# Patient Record
Sex: MALE | Race: OTHER | Employment: OTHER | URBAN - METROPOLITAN AREA
[De-identification: names, ages, dates, MRNs, and addresses within clinical notes are randomized per-mention and may not be internally consistent; named-entity substitution may affect disease eponyms.]

---

## 2024-10-16 ENCOUNTER — OFFICE VISIT (OUTPATIENT)
Dept: FAMILY MEDICINE CLINIC | Age: 74
End: 2024-10-16
Payer: MEDICARE

## 2024-10-16 VITALS
HEART RATE: 119 BPM | BODY MASS INDEX: 31.58 KG/M2 | OXYGEN SATURATION: 97 % | WEIGHT: 213.2 LBS | TEMPERATURE: 98 F | SYSTOLIC BLOOD PRESSURE: 154 MMHG | DIASTOLIC BLOOD PRESSURE: 110 MMHG | HEIGHT: 69 IN

## 2024-10-16 DIAGNOSIS — Z76.89 ENCOUNTER TO ESTABLISH CARE: ICD-10-CM

## 2024-10-16 DIAGNOSIS — Z11.59 NEED FOR HEPATITIS C SCREENING TEST: ICD-10-CM

## 2024-10-16 DIAGNOSIS — Z21 HIV INFECTION, UNSPECIFIED SYMPTOM STATUS (HCC): ICD-10-CM

## 2024-10-16 DIAGNOSIS — R73.03 PRE-DIABETES: ICD-10-CM

## 2024-10-16 DIAGNOSIS — Z12.5 SCREENING FOR PROSTATE CANCER: ICD-10-CM

## 2024-10-16 DIAGNOSIS — F32.1 CURRENT MODERATE EPISODE OF MAJOR DEPRESSIVE DISORDER WITHOUT PRIOR EPISODE (HCC): ICD-10-CM

## 2024-10-16 DIAGNOSIS — Z13.29 SCREENING FOR THYROID DISORDER: ICD-10-CM

## 2024-10-16 DIAGNOSIS — I10 PRIMARY HYPERTENSION: Primary | ICD-10-CM

## 2024-10-16 DIAGNOSIS — F17.200 SMOKER: ICD-10-CM

## 2024-10-16 DIAGNOSIS — Z13.0 SCREENING FOR DEFICIENCY ANEMIA: ICD-10-CM

## 2024-10-16 DIAGNOSIS — Z13.220 SCREENING FOR LIPID DISORDERS: ICD-10-CM

## 2024-10-16 DIAGNOSIS — Z12.11 SCREENING FOR COLON CANCER: ICD-10-CM

## 2024-10-16 DIAGNOSIS — F32.1 MODERATE MAJOR DEPRESSION, SINGLE EPISODE (HCC): ICD-10-CM

## 2024-10-16 PROBLEM — Z91.89 AT RISK FOR MEDICATION NONCOMPLIANCE: Status: ACTIVE | Noted: 2024-10-16

## 2024-10-16 PROCEDURE — 99204 OFFICE O/P NEW MOD 45 MIN: CPT | Performed by: INTERNAL MEDICINE

## 2024-10-16 PROCEDURE — 90677 PCV20 VACCINE IM: CPT | Performed by: INTERNAL MEDICINE

## 2024-10-16 PROCEDURE — 1123F ACP DISCUSS/DSCN MKR DOCD: CPT | Performed by: INTERNAL MEDICINE

## 2024-10-16 PROCEDURE — G0009 ADMIN PNEUMOCOCCAL VACCINE: HCPCS | Performed by: INTERNAL MEDICINE

## 2024-10-16 PROCEDURE — G0008 ADMIN INFLUENZA VIRUS VAC: HCPCS | Performed by: INTERNAL MEDICINE

## 2024-10-16 PROCEDURE — 3080F DIAST BP >= 90 MM HG: CPT | Performed by: INTERNAL MEDICINE

## 2024-10-16 PROCEDURE — 3077F SYST BP >= 140 MM HG: CPT | Performed by: INTERNAL MEDICINE

## 2024-10-16 PROCEDURE — 90653 IIV ADJUVANT VACCINE IM: CPT | Performed by: INTERNAL MEDICINE

## 2024-10-16 RX ORDER — SERTRALINE HYDROCHLORIDE 25 MG/1
25 TABLET, FILM COATED ORAL DAILY
Qty: 30 TABLET | Refills: 0 | Status: SHIPPED | OUTPATIENT
Start: 2024-10-16 | End: 2024-10-16

## 2024-10-16 RX ORDER — METOPROLOL SUCCINATE 50 MG/1
50 TABLET, EXTENDED RELEASE ORAL DAILY
Qty: 90 TABLET | Refills: 1 | Status: SHIPPED | OUTPATIENT
Start: 2024-10-16

## 2024-10-16 RX ORDER — SERTRALINE HYDROCHLORIDE 25 MG/1
25 TABLET, FILM COATED ORAL DAILY
Qty: 30 TABLET | Refills: 0 | Status: SHIPPED | OUTPATIENT
Start: 2024-10-16 | End: 2024-11-15

## 2024-10-16 SDOH — ECONOMIC STABILITY: FOOD INSECURITY: WITHIN THE PAST 12 MONTHS, THE FOOD YOU BOUGHT JUST DIDN'T LAST AND YOU DIDN'T HAVE MONEY TO GET MORE.: NEVER TRUE

## 2024-10-16 SDOH — ECONOMIC STABILITY: FOOD INSECURITY: WITHIN THE PAST 12 MONTHS, YOU WORRIED THAT YOUR FOOD WOULD RUN OUT BEFORE YOU GOT MONEY TO BUY MORE.: NEVER TRUE

## 2024-10-16 SDOH — ECONOMIC STABILITY: INCOME INSECURITY: HOW HARD IS IT FOR YOU TO PAY FOR THE VERY BASICS LIKE FOOD, HOUSING, MEDICAL CARE, AND HEATING?: NOT HARD AT ALL

## 2024-10-16 ASSESSMENT — PATIENT HEALTH QUESTIONNAIRE - PHQ9
9. THOUGHTS THAT YOU WOULD BE BETTER OFF DEAD, OR OF HURTING YOURSELF: SEVERAL DAYS
3. TROUBLE FALLING OR STAYING ASLEEP: NEARLY EVERY DAY
5. POOR APPETITE OR OVEREATING: NEARLY EVERY DAY
SUM OF ALL RESPONSES TO PHQ QUESTIONS 1-9: 12
7. TROUBLE CONCENTRATING ON THINGS, SUCH AS READING THE NEWSPAPER OR WATCHING TELEVISION: NOT AT ALL
SUM OF ALL RESPONSES TO PHQ9 QUESTIONS 1 & 2: 2
4. FEELING TIRED OR HAVING LITTLE ENERGY: NOT AT ALL
SUM OF ALL RESPONSES TO PHQ QUESTIONS 1-9: 12
8. MOVING OR SPEAKING SO SLOWLY THAT OTHER PEOPLE COULD HAVE NOTICED. OR THE OPPOSITE, BEING SO FIGETY OR RESTLESS THAT YOU HAVE BEEN MOVING AROUND A LOT MORE THAN USUAL: NOT AT ALL
SUM OF ALL RESPONSES TO PHQ QUESTIONS 1-9: 12
2. FEELING DOWN, DEPRESSED OR HOPELESS: SEVERAL DAYS
SUM OF ALL RESPONSES TO PHQ QUESTIONS 1-9: 11
6. FEELING BAD ABOUT YOURSELF - OR THAT YOU ARE A FAILURE OR HAVE LET YOURSELF OR YOUR FAMILY DOWN: NEARLY EVERY DAY
1. LITTLE INTEREST OR PLEASURE IN DOING THINGS: SEVERAL DAYS
10. IF YOU CHECKED OFF ANY PROBLEMS, HOW DIFFICULT HAVE THESE PROBLEMS MADE IT FOR YOU TO DO YOUR WORK, TAKE CARE OF THINGS AT HOME, OR GET ALONG WITH OTHER PEOPLE: SOMEWHAT DIFFICULT

## 2024-10-16 ASSESSMENT — ENCOUNTER SYMPTOMS
NAUSEA: 0
ABDOMINAL PAIN: 0
VOICE CHANGE: 0
BLOOD IN STOOL: 0
CONSTIPATION: 0
PHOTOPHOBIA: 0
EYE PAIN: 0
COLOR CHANGE: 0

## 2024-10-16 ASSESSMENT — COLUMBIA-SUICIDE SEVERITY RATING SCALE - C-SSRS
7. DID THIS OCCUR IN THE LAST THREE MONTHS: NO
1. WITHIN THE PAST MONTH, HAVE YOU WISHED YOU WERE DEAD OR WISHED YOU COULD GO TO SLEEP AND NOT WAKE UP?: YES
6. HAVE YOU EVER DONE ANYTHING, STARTED TO DO ANYTHING, OR PREPARED TO DO ANYTHING TO END YOUR LIFE?: YES
2. HAVE YOU ACTUALLY HAD ANY THOUGHTS OF KILLING YOURSELF?: NO

## 2024-10-16 NOTE — ASSESSMENT & PLAN NOTE
He has HIV since 1996 was taking medications up until he moved to [place] which is 5 years ago prefer to get referral to  infectious disease

## 2024-10-16 NOTE — PROGRESS NOTES
PHQ-9 score today: (PHQ-9 Total Score: 12), additional evaluation and assessment performed, follow-up plan includes but not limited to: Medication management and Referral to /Specialist  for evaluation and management.     [facility]  [address]  [facility] [address]  Dept: [phone]  Dept Fax: [phone]  Loc: [phone]     10/16/2024    Visit type: Establish care    Reason for Visit: Establish Care (Patient presents today to establish care. Patient has not seen a PCP in 5 years. Last PCP located in [place]. Patient has medical history of pre-dm. Patient reports being on medication for htn and thyroid management and depression but cannot recall the names. )       ASSESSMENT/PLAN   1. Primary hypertension  Assessment & Plan:   Elevated blood pressure today in the office. Will keep close F/U with nurse visit BP check in 1 week and HTN F/U in 1 month.  Due to not taking medications he did not take medications for 5 years  2. Encounter to establish care  3. Smoker  Assessment & Plan:   Advised to quit smoking  4. HIV infection, unspecified symptom status (HCC)  Assessment & Plan:   He has HIV since 1996 was taking medications up until he moved to [place] which is 5 years ago prefer to get referral to infectious disease  Orders:  -     Microalbumin / Creatinine Urine Ratio; Future  -     Ambulatory referral to Infectious Disease  5. Pre-diabetes  Assessment & Plan:   Recent labwork shows the 3 month average blood sugar as reported by the A1c level is in the pre-diabetic range. This result increases the future risk of developing diabetes. A healthy lower carbohydrate diet that is high in fresh vegetables, fiber and whole grains is important. Weight loss is recommended along with routine exercise working toward a goal of 150 minutes a week. We will follow this over time with repeat testing every 6-12 months.   Orders:  -     Comprehensive Metabolic

## 2024-10-16 NOTE — ASSESSMENT & PLAN NOTE
Elevated blood pressure today in the office. Will keep close F/U with nurse visit BP check in 1 week and HTN F/U in 1 month.  Due to not taking medications he did not take medications for 5 years

## 2024-10-16 NOTE — ASSESSMENT & PLAN NOTE
Did not take any medications for 5 years did not see any doctors for 5 years since he moved to [place] from [place]

## 2024-10-16 NOTE — ASSESSMENT & PLAN NOTE
Started on Zoloft but patient is not in suicidal ideation  Strongly advised if she or develop any suicidal ideation he has to alert his children or call the ambulance and go to the emergency room or give us a call

## 2024-10-18 ENCOUNTER — TELEPHONE (OUTPATIENT)
Dept: INFECTIOUS DISEASES | Age: 74
End: 2024-10-18

## 2024-10-18 NOTE — TELEPHONE ENCOUNTER
Pt returned call.   From [place]  No meds x 5 years  Can not remember lowest CD4  Discussed intake process.   Discussed [facility] and provided services.   Pt verbalized understanding   He will provide SS income, ID/bill and insurance card.   Intake scheduled for 11.4.24 per pt request,   This RN did offer earlier apt. Pt prefers 11.4.24

## 2024-10-30 ENCOUNTER — HOSPITAL ENCOUNTER (OUTPATIENT)
Dept: LAB | Age: 74
Discharge: HOME | End: 2024-10-30
Payer: MEDICARE

## 2024-10-30 ENCOUNTER — OFFICE VISIT (OUTPATIENT)
Dept: FAMILY MEDICINE CLINIC | Age: 74
End: 2024-10-30

## 2024-10-30 VITALS
BODY MASS INDEX: 31.48 KG/M2 | OXYGEN SATURATION: 99 % | DIASTOLIC BLOOD PRESSURE: 102 MMHG | HEIGHT: 69 IN | TEMPERATURE: 97.5 F | SYSTOLIC BLOOD PRESSURE: 166 MMHG | HEART RATE: 97 BPM | RESPIRATION RATE: 20 BRPM

## 2024-10-30 DIAGNOSIS — Z13.0 SCREENING FOR DEFICIENCY ANEMIA: ICD-10-CM

## 2024-10-30 DIAGNOSIS — Z11.59 NEED FOR HEPATITIS C SCREENING TEST: ICD-10-CM

## 2024-10-30 DIAGNOSIS — Z21 HIV INFECTION, UNSPECIFIED SYMPTOM STATUS (HCC): ICD-10-CM

## 2024-10-30 DIAGNOSIS — Z12.5 SCREENING FOR PROSTATE CANCER: ICD-10-CM

## 2024-10-30 DIAGNOSIS — Z13.220 SCREENING FOR LIPID DISORDERS: ICD-10-CM

## 2024-10-30 DIAGNOSIS — B20 CURRENTLY ASYMPTOMATIC HIV INFECTION, WITH HISTORY OF HIV-RELATED ILLNESS (HCC): ICD-10-CM

## 2024-10-30 DIAGNOSIS — F32.1 CURRENT MODERATE EPISODE OF MAJOR DEPRESSIVE DISORDER WITHOUT PRIOR EPISODE (HCC): ICD-10-CM

## 2024-10-30 DIAGNOSIS — R73.03 PRE-DIABETES: ICD-10-CM

## 2024-10-30 DIAGNOSIS — Z13.29 SCREENING FOR THYROID DISORDER: ICD-10-CM

## 2024-10-30 DIAGNOSIS — I10 PRIMARY HYPERTENSION: Primary | ICD-10-CM

## 2024-10-30 PROBLEM — Z76.89 ENCOUNTER TO ESTABLISH CARE: Status: RESOLVED | Noted: 2024-10-16 | Resolved: 2024-10-30

## 2024-10-30 LAB
ALBUMIN SERPL-MCNC: 4.2 G/DL (ref 3.5–4.6)
ALP SERPL-CCNC: 113 U/L (ref 35–104)
ALT SERPL-CCNC: 9 U/L (ref 0–41)
ANION GAP SERPL CALCULATED.3IONS-SCNC: 16 MEQ/L (ref 9–15)
AST SERPL-CCNC: 15 U/L (ref 0–40)
BASOPHILS # BLD: 0.1 K/UL (ref 0–0.2)
BASOPHILS NFR BLD: 1.1 %
BILIRUB SERPL-MCNC: 0.5 MG/DL (ref 0.2–0.7)
BUN SERPL-MCNC: 18 MG/DL (ref 8–23)
CALCIUM SERPL-MCNC: 9.3 MG/DL (ref 8.5–9.9)
CHLORIDE SERPL-SCNC: 99 MEQ/L (ref 95–107)
CHOLEST SERPL-MCNC: 221 MG/DL (ref 0–199)
CO2 SERPL-SCNC: 20 MEQ/L (ref 20–31)
CREAT SERPL-MCNC: 1.26 MG/DL (ref 0.7–1.2)
CREAT UR-MCNC: 189.1 MG/DL
EOSINOPHIL # BLD: 0.1 K/UL (ref 0–0.7)
EOSINOPHIL NFR BLD: 1.6 %
ERYTHROCYTE [DISTWIDTH] IN BLOOD BY AUTOMATED COUNT: 13 % (ref 11.5–14.5)
GLOBULIN SER CALC-MCNC: 4.9 G/DL (ref 2.3–3.5)
GLUCOSE FASTING: 157 MG/DL (ref 70–99)
HCT VFR BLD AUTO: 47.1 % (ref 42–52)
HDLC SERPL-MCNC: 37 MG/DL (ref 40–59)
HGB BLD-MCNC: 16.3 G/DL (ref 14–18)
LDL CHOLESTEROL: 139 MG/DL (ref 0–129)
LYMPHOCYTES # BLD: 2 K/UL (ref 1–4.8)
LYMPHOCYTES NFR BLD: 26.5 %
MCH RBC QN AUTO: 29.4 PG (ref 27–31.3)
MCHC RBC AUTO-ENTMCNC: 34.6 % (ref 33–37)
MCV RBC AUTO: 84.9 FL (ref 79–92.2)
MICROALBUMIN UR-MCNC: 332.1 MG/DL
MICROALBUMIN/CREAT UR-RTO: 1756.2 MG/G (ref 0–30)
MONOCYTES # BLD: 0.7 K/UL (ref 0.2–0.8)
MONOCYTES NFR BLD: 8.7 %
NEUTROPHILS # BLD: 4.6 K/UL (ref 1.4–6.5)
NEUTS SEG NFR BLD: 60.6 %
PLATELET # BLD AUTO: 420 K/UL (ref 130–400)
POTASSIUM SERPL-SCNC: 3.9 MEQ/L (ref 3.4–4.9)
PROT SERPL-MCNC: 9.1 G/DL (ref 6.3–8)
PSA SERPL-MCNC: 1.96 NG/ML (ref 0–4)
RBC # BLD AUTO: 5.55 M/UL (ref 4.7–6.1)
SODIUM SERPL-SCNC: 135 MEQ/L (ref 135–144)
TRIGLYCERIDE, FASTING: 226 MG/DL (ref 0–150)
TSH SERPL-MCNC: 6.45 UIU/ML (ref 0.44–3.86)
WBC # BLD AUTO: 7.6 K/UL (ref 4.8–10.8)

## 2024-10-30 PROCEDURE — 84443 ASSAY THYROID STIM HORMONE: CPT

## 2024-10-30 PROCEDURE — 80053 COMPREHEN METABOLIC PANEL: CPT

## 2024-10-30 PROCEDURE — 36415 COLL VENOUS BLD VENIPUNCTURE: CPT

## 2024-10-30 PROCEDURE — 82043 UR ALBUMIN QUANTITATIVE: CPT

## 2024-10-30 PROCEDURE — 85025 COMPLETE CBC W/AUTO DIFF WBC: CPT

## 2024-10-30 PROCEDURE — 86803 HEPATITIS C AB TEST: CPT

## 2024-10-30 PROCEDURE — 84153 ASSAY OF PSA TOTAL: CPT

## 2024-10-30 PROCEDURE — 83036 HEMOGLOBIN GLYCOSYLATED A1C: CPT

## 2024-10-30 PROCEDURE — 82570 ASSAY OF URINE CREATININE: CPT

## 2024-10-30 PROCEDURE — 80061 LIPID PANEL: CPT

## 2024-10-30 RX ORDER — METOPROLOL SUCCINATE 25 MG/1
25 TABLET, EXTENDED RELEASE ORAL NIGHTLY
Qty: 90 TABLET | Refills: 1 | Status: SHIPPED | OUTPATIENT
Start: 2024-10-30

## 2024-10-30 ASSESSMENT — ENCOUNTER SYMPTOMS
VOICE CHANGE: 0
ABDOMINAL PAIN: 0
EYE PAIN: 0
BLOOD IN STOOL: 0
PHOTOPHOBIA: 0
COLOR CHANGE: 0
CONSTIPATION: 0
NAUSEA: 0

## 2024-10-30 NOTE — ASSESSMENT & PLAN NOTE
Still uncontrolled strongly advised DASH diet continue metoprolol 50 mg in the morning I added metoprolol 20 5 at night follow-up in 1 week

## 2024-10-30 NOTE — PROGRESS NOTES
[facility]  [address]  [facility] [address]  Dept: [phone]  Dept Fax: [phone]  Loc: [phone]     10/30/2024    Visit type: Follow up on hypertension    Reason for Visit: Follow-up (Patient is here for a 1 wk follow up)       ASSESSMENT/PLAN   1. Primary hypertension  Assessment & Plan:   Still uncontrolled strongly advised DASH diet continue metoprolol 50 mg in the morning I added metoprolol 20 5 at night follow-up in 1 week  2. Pre-diabetes  3. Currently asymptomatic HIV infection, with history of HIV-related illness (HCC)  Assessment & Plan:   Has an appointment with infectious disease on November 4  4. Current moderate episode of major depressive disorder without prior episode ([facility])  Assessment & Plan:   Started on Zoloft but patient is not in suicidal ideation  Strongly advised if she or develop any suicidal ideation he has to alert his children or call the ambulance and go to the emergency room or give us a call    Return in about 1 week (around 11/6/2024).  Orders Placed This Encounter   Medications    metoprolol succinate (TOPROL XL) 25 MG extended release tablet     Sig: Take 1 tablet by mouth at bedtime     Dispense:  90 tablet     Refill:  1        Subjective        Subjective    HPI:  Patient: Juan Luis Billings is a 74 y.o. male past medical history of uncontrolled hypertension depression HIV with an appointment infectious disease for November 4 he supposed to do blood work but he did not do it he is here for follow-up on his blood pressure during the last visit he did not take his medication for couple of days but he is taking now every day still his blood pressure is elevated no headache no chest pain no shortness of breath      Review of Systems   Constitutional:  Negative for appetite change and fatigue.   HENT:  Negative for dental problem, hearing loss, nosebleeds and voice change.    Eyes:  Negative for photophobia, pain

## 2024-10-31 LAB
ESTIMATED AVERAGE GLUCOSE: 148 MG/DL
HBA1C MFR BLD: 6.8 % (ref 4–6)
HEPATITIS C ANTIBODY: REACTIVE

## 2024-11-04 ENCOUNTER — LAB (OUTPATIENT)
Dept: INFECTIOUS DISEASES | Age: 74
End: 2024-11-04

## 2024-11-04 DIAGNOSIS — Z21 HIV INFECTION, UNSPECIFIED SYMPTOM STATUS (HCC): Primary | ICD-10-CM

## 2024-11-04 DIAGNOSIS — Z21 HIV INFECTION, UNSPECIFIED SYMPTOM STATUS (HCC): ICD-10-CM

## 2024-11-04 LAB
ALBUMIN SERPL-MCNC: 3.9 G/DL (ref 3.5–4.6)
ALP SERPL-CCNC: 115 U/L (ref 35–104)
ALT SERPL-CCNC: 12 U/L (ref 0–41)
ANION GAP SERPL CALCULATED.3IONS-SCNC: 15 MEQ/L (ref 9–15)
AST SERPL-CCNC: 16 U/L (ref 0–40)
BILIRUB SERPL-MCNC: 0.4 MG/DL (ref 0.2–0.7)
BUN SERPL-MCNC: 18 MG/DL (ref 8–23)
CALCIUM SERPL-MCNC: 9.3 MG/DL (ref 8.5–9.9)
CHLORIDE SERPL-SCNC: 102 MEQ/L (ref 95–107)
CO2 SERPL-SCNC: 20 MEQ/L (ref 20–31)
CREAT SERPL-MCNC: 1.36 MG/DL (ref 0.7–1.2)
ERYTHROCYTE [DISTWIDTH] IN BLOOD BY AUTOMATED COUNT: 13.1 % (ref 11.5–14.5)
GLOBULIN SER CALC-MCNC: 4.6 G/DL (ref 2.3–3.5)
GLUCOSE SERPL-MCNC: 168 MG/DL (ref 70–99)
HCT VFR BLD AUTO: 43.3 % (ref 42–52)
HGB BLD-MCNC: 15.2 G/DL (ref 14–18)
MCH RBC QN AUTO: 29.4 PG (ref 27–31.3)
MCHC RBC AUTO-ENTMCNC: 35.1 % (ref 33–37)
MCV RBC AUTO: 83.8 FL (ref 79–92.2)
PLATELET # BLD AUTO: 382 K/UL (ref 130–400)
POTASSIUM SERPL-SCNC: 3.9 MEQ/L (ref 3.4–4.9)
PROT SERPL-MCNC: 8.5 G/DL (ref 6.3–8)
RBC # BLD AUTO: 5.17 M/UL (ref 4.7–6.1)
SODIUM SERPL-SCNC: 137 MEQ/L (ref 135–144)
WBC # BLD AUTO: 6.2 K/UL (ref 4.8–10.8)

## 2024-11-04 ASSESSMENT — PATIENT HEALTH QUESTIONNAIRE - PHQ9
SUM OF ALL RESPONSES TO PHQ QUESTIONS 1-9: 1
1. LITTLE INTEREST OR PLEASURE IN DOING THINGS: NOT AT ALL
2. FEELING DOWN, DEPRESSED OR HOPELESS: SEVERAL DAYS
SUM OF ALL RESPONSES TO PHQ QUESTIONS 1-9: 1
SUM OF ALL RESPONSES TO PHQ9 QUESTIONS 1 & 2: 1

## 2024-11-04 NOTE — PROGRESS NOTES
RN Intake      Patient ID:   Juan Luis Billings  Date:   2024        :[birth year]       Residency:   [address] [place] [id number]  [phone] (home)   [x] OK to leave voicemail     Race:    Family/House:    [] Partner  [] Children  [x] Other - alone    Housing:   apartment    Transport:  Car -- uses friends car or daughters   Discussed help with transport only as last resort.        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 Pt declined.  resources given to pt.    Discussed referral if needed.  Substance Abuse:   Social History     Socioeconomic History    Marital status: Unknown     Spouse name: Not on file    Number of children: Not on file    Years of education: Not on file    Highest education level: Not on file   Occupational History    Not on file   Tobacco Use    Smoking status: Every Day     Current packs/day: 0.50     Average packs/day: 0.5 packs/day for 45.8 years (22.9 ttl pk-yrs)     Types: Cigarettes     Start date:     Smokeless tobacco: Never   Vaping Use    Vaping status: Never Used   Substance and Sexual Activity    Alcohol use: Yes     Alcohol/week: 2.0 standard drinks of alcohol     Types: 2 Cans of beer per week     Comment: per week    Drug use: Not Currently    Sexual activity: Yes     Partners: Female   Other Topics Concern    Not on file   Social History Narrative    Not on file     Social Determinants of Health     Financial Resource Strain: Low Risk  (10/16/2024)    Overall Financial Resource Strain (CARDIA)     Difficulty of Paying Living Expenses: Not hard at all   Food Insecurity: No Food Insecurity (10/16/2024)    Hunger Vital Sign     Worried About Running Out of Food in the Last Year: Never true     Ran Out of Food in the Last Year: Never true   Transportation Needs: Unknown (10/16/2024)    PRAPARE - Transportation     Lack of Transportation (Medical): Not on file     Lack of

## 2024-11-05 LAB
CD3+CD4+ CELLS # BLD: 341 CELLS/UL
CD4 % HELPER T CELL: 20 % (ref 35–68)
IMMUNODEFICIENCY MARKERS SPEC-IMP: ABNORMAL
RPR SER QL: NORMAL

## 2024-11-06 LAB
GAMMA INTERFERON BACKGROUND BLD IA-ACNC: 0.23 IU/ML
HCV RNA SERPL NAA+PROBE-ACNC: NOT DETECTED IU/ML
HCV RNA SERPL NAA+PROBE-LOG IU: NOT DETECTED LOG IU/ML
HCV RNA SERPL QL NAA+PROBE: NOT DETECTED
M TB IFN-G BLD-IMP: NEGATIVE
M TB IFN-G CD4+ BCKGRND COR BLD-ACNC: 0.28 IU/ML
M TB IFN-G CD4+CD8+ BCKGRND COR BLD-ACNC: 0.17 IU/ML
MITOGEN IGNF BCKGRD COR BLD-ACNC: 9.49 IU/ML

## 2024-11-07 LAB
HIV QUANT LOG: 3.27 LOG CPY/ML
HIV-1 RNA BY PCR, QN: 1850 CPY/ML
HIV-1 RNA BY PCR, QN: DETECTED
SOURCE: ABNORMAL

## 2024-11-07 NOTE — PROGRESS NOTES
Records received 11.7.24  Pt to sign EFRAIN for Care Everywhere at apt.   Paper EFRAIN competed for record retrieval prior to initial apt here.       LOV 6.28.2019    Labs to note:   9.21.18 VL - not detected   9.21.18 CD4-- 449 26 %    2.22.19: VL -- < 40   2.22.19 CD4-- 370 21%      8.19.2010 --HEPAVAB - positive  8.19.2010 -- HEPBSAG - negative  8.28.14 -- HEPBSAB -- negative     Vaccines:  Hep B   12.17.13  6.16.14   1.15.14    PPSV23 - 2013  TDAP - 2015    History if IVDU and ETOH abuse  Per notes stopped 1990 (heroin and crack)   Stopped drinking 1996

## 2024-11-09 LAB
HIV 1 RNA RT + PR + IN MUT DET PLAS SEQ: NORMAL
LABORATORY REPORT: NORMAL

## 2024-11-11 DIAGNOSIS — F32.1 MODERATE MAJOR DEPRESSION, SINGLE EPISODE (HCC): ICD-10-CM

## 2024-11-11 RX ORDER — SERTRALINE HYDROCHLORIDE 25 MG/1
25 TABLET, FILM COATED ORAL DAILY
Qty: 30 TABLET | Refills: 0 | Status: SHIPPED | OUTPATIENT
Start: 2024-11-11 | End: 2024-12-11

## 2024-11-11 NOTE — TELEPHONE ENCOUNTER
Comments:     Last Office Visit (last PCP visit):   10/30/2024    Next Visit Date:  Future Appointments   Date Time Provider Department Center   11/11/2024  9:15 AM Mari Bello MD [facility] DEP   11/11/2024  3:00 PM Mari Bello MD [facility] DEP   11/20/2024  9:00 AM Kathryn León MD [facility]   11/20/2024 10:00 AM Gina Rodriguez LISW [facility]       **If hasn't been seen in over a year OR hasn't followed up according to last diabetes/ADHD visit, make appointment for patient before sending refill to provider.    Rx requested:  Requested Prescriptions     Pending Prescriptions Disp Refills    sertraline (ZOLOFT) 25 MG tablet [Pharmacy Med Name: SERTRALINE 25MG TABLETS] 30 tablet 0     Sig: TAKE 1 TABLET BY MOUTH DAILY

## 2024-11-15 PROBLEM — Z13.0 SCREENING FOR DEFICIENCY ANEMIA: Status: RESOLVED | Noted: 2024-10-16 | Resolved: 2024-11-15

## 2024-11-15 PROBLEM — Z11.59 NEED FOR HEPATITIS C SCREENING TEST: Status: RESOLVED | Noted: 2024-10-16 | Resolved: 2024-11-15

## 2024-11-15 PROBLEM — Z12.11 SCREENING FOR COLON CANCER: Status: RESOLVED | Noted: 2024-10-16 | Resolved: 2024-11-15

## 2024-11-15 PROBLEM — Z12.5 SCREENING FOR PROSTATE CANCER: Status: RESOLVED | Noted: 2024-10-16 | Resolved: 2024-11-15

## 2024-11-15 PROBLEM — Z13.220 SCREENING FOR LIPID DISORDERS: Status: RESOLVED | Noted: 2024-10-16 | Resolved: 2024-11-15

## 2024-11-15 PROBLEM — Z13.29 SCREENING FOR THYROID DISORDER: Status: RESOLVED | Noted: 2024-10-16 | Resolved: 2024-11-15

## 2024-11-20 ENCOUNTER — OFFICE VISIT (OUTPATIENT)
Dept: INFECTIOUS DISEASES | Age: 74
End: 2024-11-20
Payer: MEDICARE

## 2024-11-20 ENCOUNTER — LAB (OUTPATIENT)
Dept: INFECTIOUS DISEASES | Age: 74
End: 2024-11-20

## 2024-11-20 VITALS
WEIGHT: 214.2 LBS | HEART RATE: 96 BPM | SYSTOLIC BLOOD PRESSURE: 157 MMHG | TEMPERATURE: 97.2 F | BODY MASS INDEX: 31.63 KG/M2 | DIASTOLIC BLOOD PRESSURE: 94 MMHG

## 2024-11-20 DIAGNOSIS — E78.2 MIXED HYPERLIPIDEMIA: ICD-10-CM

## 2024-11-20 DIAGNOSIS — N18.31 STAGE 3A CHRONIC KIDNEY DISEASE (HCC): ICD-10-CM

## 2024-11-20 DIAGNOSIS — I10 HYPERTENSION, UNSPECIFIED TYPE: ICD-10-CM

## 2024-11-20 DIAGNOSIS — B20 HUMAN IMMUNODEFICIENCY VIRUS (HIV) DISEASE (HCC): Primary | ICD-10-CM

## 2024-11-20 DIAGNOSIS — R73.03 BORDERLINE TYPE 2 DIABETES MELLITUS: ICD-10-CM

## 2024-11-20 DIAGNOSIS — Z87.891 PERSONAL HISTORY OF TOBACCO USE: ICD-10-CM

## 2024-11-20 PROCEDURE — 1159F MED LIST DOCD IN RCRD: CPT | Performed by: INTERNAL MEDICINE

## 2024-11-20 PROCEDURE — 3077F SYST BP >= 140 MM HG: CPT | Performed by: INTERNAL MEDICINE

## 2024-11-20 PROCEDURE — G0296 VISIT TO DETERM LDCT ELIG: HCPCS | Performed by: INTERNAL MEDICINE

## 2024-11-20 PROCEDURE — 99204 OFFICE O/P NEW MOD 45 MIN: CPT | Performed by: INTERNAL MEDICINE

## 2024-11-20 PROCEDURE — 1123F ACP DISCUSS/DSCN MKR DOCD: CPT | Performed by: INTERNAL MEDICINE

## 2024-11-20 PROCEDURE — 3080F DIAST BP >= 90 MM HG: CPT | Performed by: INTERNAL MEDICINE

## 2024-11-20 RX ORDER — LEVOTHYROXINE SODIUM 75 UG/1
75 TABLET ORAL DAILY
Qty: 30 TABLET | Refills: 5 | Status: SHIPPED | OUTPATIENT
Start: 2024-11-20

## 2024-11-20 RX ORDER — LISINOPRIL 10 MG/1
10 TABLET ORAL DAILY
Qty: 30 TABLET | Refills: 5 | Status: SHIPPED | OUTPATIENT
Start: 2024-11-20

## 2024-11-20 RX ORDER — ROSUVASTATIN CALCIUM 20 MG/1
20 TABLET, COATED ORAL NIGHTLY
Qty: 30 TABLET | Refills: 5 | Status: SHIPPED | OUTPATIENT
Start: 2024-11-20

## 2024-11-20 ASSESSMENT — PATIENT HEALTH QUESTIONNAIRE - PHQ9
SUM OF ALL RESPONSES TO PHQ9 QUESTIONS 1 & 2: 0
SUM OF ALL RESPONSES TO PHQ QUESTIONS 1-9: 0
SUM OF ALL RESPONSES TO PHQ QUESTIONS 1-9: 0
1. LITTLE INTEREST OR PLEASURE IN DOING THINGS: NOT AT ALL
SUM OF ALL RESPONSES TO PHQ QUESTIONS 1-9: 0
SUM OF ALL RESPONSES TO PHQ QUESTIONS 1-9: 0
2. FEELING DOWN, DEPRESSED OR HOPELESS: NOT AT ALL

## 2024-11-20 NOTE — PROGRESS NOTES
New pt apt today.   Doing ok. Ready to start medications.    Discussed readiness to start medication and reviewed need for 100% compliance.     Patient will be starting new medication Biktarvy - 1 tab daily   Reviewed pill size    Educated patient @ length about medication along with most common side effect a  Discussed how and when to take medication  (one pill a day. Usually at the same time.. AM or PM  NO DOUBLE DOSEING - can hurt kidneys/liver  Take with food (or with out) recommended with food at start,     Side effects tend to wear off after 4 weeks or so and not everyone has side effects   pt verbalized understanding of medication and how to take it.   Pill box given to pt to help with compliance        GOAL -- medication is ok to be filled at requested pharmacy and go through today without issue.   Discussed with pt possibility of pharmacy needing to order medication or possibility of need for PA. Pt verbalized understanding.    Pt will call pharmacy later this afternoon to see if med was processed. Will call with problems regarding med.   Pt is to call with start date.     Patient advised he will need to obtain labs 4-6 weeks after start date. -- approx week of 12.20.24  Requested patient to call office with any intolerable side effects or further questions.    6-8 week follow up with Dr eLón scheduled -- 1.20.24    Pt also was prescribed -- per Dr León  Crestor 20 mg daily --   Synthroid  75 MCG daily  Lisinopril 10 mg daily  Pt is to STOP metoprolol 25 mg and continue 50 mg only  All medications reviewed at length.education regarding each medication and why it is needed discussed. We wrote all medications out as well. He read back and verbalized understanding.   He will call [facility] for these meds and  today.     He is aware that Biktarvy may need ordered or PA needed possibility. If he can start ART today he will call office with update.           Patient verbalized all understanding and 
tenderness, no hepatosplenomegaly  No leg edema  No erythema, no tenderness  Normal speech and gait  Normal affect and judgment  Normal behavior    Current labs were reviewed  CD4 of 341  Complete metabolic profile with creatinine of 1.36 and GFR of 54.5  Elevated total protein and globulin and alkaline phosphatase noted and  HIV viral load of 1850  Hep C viral load is undetectable  HIV phenotypic analysis was reviewed with high level of assistance to if evidence and nifedipine  TB QuantiFERON and RPR is negative  Non-nucleoside Reverse Transcriptase Inhibitor Drug Class             Doravirine,LEATHA              Susceptible             Efavirenz,EFV               High-Level Resistance             Etravirine,ETR              Susceptible             Nevirapine,NVP              High-Level Resistance             Rilpivirine,RPV             Susceptible               NNRTI drug resistance mutations identified:  [id number]               RT accessory resistance mutations identified:  None               RT additional mutations identified: R83K, K101R, D123E,   V245E,   A272P, V276I, K277R, K281R, E297K, K390R, A400T, F416Y, V417I, V435I   Sensitive vitals otherwise  Last hemoglobin A1c of 6.8  Lipid profile with elevated cholesterol of 221 LDL of 139  Elevated triglyceride and low HDL  An electronic signature was used to authenticate this note.    --Kathryn León MD   Discussed with the patient the current USPSTF guidelines released March 9, 2021 for screening for lung cancer.    For adults aged 50 to 80 years who have a 20 pack-year smoking history and currently smoke or have quit within the past 15 years the grade B recommendation is to:  Screen for lung cancer with low-dose computed tomography (LDCT) every year.  Stop screening once a person has not smoked for 15 years or has a health problem that limits life expectancy or the ability to have lung surgery.    The patient  reports that he has been smoking cigarettes. He started

## 2024-11-20 NOTE — PATIENT INSTRUCTIONS
packs a day for 10 years, that's 2 times 10. So you have a smoking history of 20 pack years.  Experts agree that screening is for people who have a high risk of lung cancer. But experts don't agree on what high risk means. Some say people age 50 or older with at least a 20-pack-year smoking history are high risk. Others say it's people age 55 or older with a 30-pack-year history.  To see if you could benefit from screening, first find out if you are at high risk for lung cancer. Your doctor can help you decide your lung cancer risk.  What are the risks of screening?  CT screening for lung cancer isn't perfect. It can show an abnormal result when it turns out there wasn't any cancer. This is called a false-positive result. This means you may need more tests to make sure you don't have cancer. These tests can be harmful and cause a lot of worry.  These tests may include more CT scans and invasive testing like a lung biopsy. In a biopsy, the doctor takes a sample of tissue from inside your lung so it can be looked at under a microscope. A biopsy is the only way to tell if you have lung cancer. If the biopsy finds cancer, you and your doctor will have to decide how or whether to treat it.  Some lung cancers found on CT scans are harmless and would not have caused a problem if they had not been found through screening. But because doctors can't tell which ones will turn out to be harmless, most will be treated. This means that you may get treatment--including surgery, radiation, or chemotherapy--that you don't need.  There is a risk of damage to cells or tissue from being exposed to radiation, including the small amounts used in CTs, X-rays, and other medical tests. Over time, exposure to radiation may cause cancer and other health problems. But in most cases, the risk of getting cancer from being exposed to small amounts of radiation is low. It's not a reason to avoid these tests for most people.  What are the benefits

## 2024-11-20 NOTE — PROGRESS NOTES
Not on file     Homeless in the Last Year: No       Housing:   apartment    Health/Healthcare:  Re-engaging in care   Physician Visit:  Dr. León 24   Dental Visit:  CM encouraged follow up    CD4:   No results found for: \"LABABSO\"    Viral Load:  Lab Results   Component Value Date/Time    [id number] DETECTED 2024 09:22 AM    [id number] 1850 2024 09:22 AM       Medical Insurance:  Payor: [facility] MEDICARE / Plan: ANTHEM FULL DUAL ADVANTAGE 2 / Product Type: *No Product type* /    OHDAP/HIPSCA:  NA   Renewal Date:  NA    Income:    SSI    Legal Issues:    NA    Emergency Contact:   Extended Emergency Contact Information  Primary Emergency Contact: Mary Ellen Palacios  Mobile Phone: [phone]  Relation: Child  Preferred language: English   needed? No    [facility] Case Management Psychosocial Assessment     BASIC NEEDS                                  Annual Score=     0                      Semi-Annual Score =           Evaluate the client's needs to independently obtain food and clothing and take care of activities of daily living    Food and Clothin.Do you have access to food? Yes    2.Do you have access to appropriate clothing (e.g., per season, per gender, clean)? Yes    3.Do you utilize: Food [employer]    4.Do you need help accessing assistance programs (e.g., food stamps, WIC, pantries) to meet your basic needs (e.g., food,clothing)?      No          If YES: 4a. How frequently? NA                      4b. What resources do you use for food assistance?     Activities of Daily Living Skills (ADLs):    5.Do you experience difficulty with any of the following?                              5a. Feeding yourself                                   No                            5b. Walking                                                  No                            5c. Getting in and out of a bed/chair         No                            5d. Taking a bath/shower

## 2024-11-25 ENCOUNTER — TELEPHONE (OUTPATIENT)
Dept: INFECTIOUS DISEASES | Age: 74
End: 2024-11-25

## 2024-11-25 NOTE — TELEPHONE ENCOUNTER
Call placed to pt as check in from new pt apt.   Unsure if pt was able to obtain meds yet.   VM left for call back

## 2024-11-27 ENCOUNTER — OFFICE VISIT (OUTPATIENT)
Dept: FAMILY MEDICINE CLINIC | Age: 74
End: 2024-11-27

## 2024-11-27 VITALS
HEIGHT: 69 IN | TEMPERATURE: 98.1 F | DIASTOLIC BLOOD PRESSURE: 78 MMHG | BODY MASS INDEX: 31.52 KG/M2 | OXYGEN SATURATION: 96 % | HEART RATE: 105 BPM | WEIGHT: 212.8 LBS | SYSTOLIC BLOOD PRESSURE: 138 MMHG

## 2024-11-27 DIAGNOSIS — Z13.6 SCREENING FOR CARDIOVASCULAR CONDITION: ICD-10-CM

## 2024-11-27 DIAGNOSIS — E11.69 HYPERLIPIDEMIA ASSOCIATED WITH TYPE 2 DIABETES MELLITUS (HCC): ICD-10-CM

## 2024-11-27 DIAGNOSIS — R73.03 BORDERLINE TYPE 2 DIABETES MELLITUS: ICD-10-CM

## 2024-11-27 DIAGNOSIS — Z87.891 PERSONAL HISTORY OF TOBACCO USE: ICD-10-CM

## 2024-11-27 DIAGNOSIS — E78.5 HYPERLIPIDEMIA ASSOCIATED WITH TYPE 2 DIABETES MELLITUS (HCC): ICD-10-CM

## 2024-11-27 DIAGNOSIS — E11.59 HYPERTENSION ASSOCIATED WITH DIABETES (HCC): ICD-10-CM

## 2024-11-27 DIAGNOSIS — Z12.5 SCREENING FOR PROSTATE CANCER: ICD-10-CM

## 2024-11-27 DIAGNOSIS — Z71.89 ACP (ADVANCE CARE PLANNING): ICD-10-CM

## 2024-11-27 DIAGNOSIS — Z87.891 PERSONAL HISTORY OF TOBACCO USE, PRESENTING HAZARDS TO HEALTH: ICD-10-CM

## 2024-11-27 DIAGNOSIS — Z00.00 INITIAL MEDICARE ANNUAL WELLNESS VISIT: Primary | ICD-10-CM

## 2024-11-27 DIAGNOSIS — I15.2 HYPERTENSION ASSOCIATED WITH DIABETES (HCC): ICD-10-CM

## 2024-11-27 DIAGNOSIS — E03.8 OTHER SPECIFIED HYPOTHYROIDISM: ICD-10-CM

## 2024-11-27 DIAGNOSIS — Z13.0 SCREENING FOR DEFICIENCY ANEMIA: ICD-10-CM

## 2024-11-27 DIAGNOSIS — F17.210 CIGARETTE NICOTINE DEPENDENCE, UNCOMPLICATED: ICD-10-CM

## 2024-11-27 PROBLEM — I10 HYPERTENSION: Status: RESOLVED | Noted: 2024-10-16 | Resolved: 2024-11-27

## 2024-11-27 RX ORDER — LANCETS 30 GAUGE
1 EACH MISCELLANEOUS DAILY
Qty: 100 EACH | Refills: 5 | Status: SHIPPED | OUTPATIENT
Start: 2024-11-27

## 2024-11-27 RX ORDER — BLOOD-GLUCOSE METER
1 EACH MISCELLANEOUS DAILY
Qty: 1 KIT | Refills: 0 | Status: SHIPPED | OUTPATIENT
Start: 2024-11-27

## 2024-11-27 RX ORDER — METFORMIN HYDROCHLORIDE 500 MG/1
500 TABLET, EXTENDED RELEASE ORAL
Qty: 90 TABLET | Refills: 1 | Status: SHIPPED | OUTPATIENT
Start: 2024-11-27

## 2024-11-27 ASSESSMENT — PATIENT HEALTH QUESTIONNAIRE - PHQ9
3. TROUBLE FALLING OR STAYING ASLEEP: NOT AT ALL
2. FEELING DOWN, DEPRESSED OR HOPELESS: NOT AT ALL
SUM OF ALL RESPONSES TO PHQ QUESTIONS 1-9: 0
7. TROUBLE CONCENTRATING ON THINGS, SUCH AS READING THE NEWSPAPER OR WATCHING TELEVISION: NOT AT ALL
1. LITTLE INTEREST OR PLEASURE IN DOING THINGS: NOT AT ALL
10. IF YOU CHECKED OFF ANY PROBLEMS, HOW DIFFICULT HAVE THESE PROBLEMS MADE IT FOR YOU TO DO YOUR WORK, TAKE CARE OF THINGS AT HOME, OR GET ALONG WITH OTHER PEOPLE: NOT DIFFICULT AT ALL
SUM OF ALL RESPONSES TO PHQ QUESTIONS 1-9: 0
8. MOVING OR SPEAKING SO SLOWLY THAT OTHER PEOPLE COULD HAVE NOTICED. OR THE OPPOSITE, BEING SO FIGETY OR RESTLESS THAT YOU HAVE BEEN MOVING AROUND A LOT MORE THAN USUAL: NOT AT ALL
5. POOR APPETITE OR OVEREATING: NOT AT ALL
6. FEELING BAD ABOUT YOURSELF - OR THAT YOU ARE A FAILURE OR HAVE LET YOURSELF OR YOUR FAMILY DOWN: NOT AT ALL
SUM OF ALL RESPONSES TO PHQ9 QUESTIONS 1 & 2: 0
SUM OF ALL RESPONSES TO PHQ QUESTIONS 1-9: 0
SUM OF ALL RESPONSES TO PHQ QUESTIONS 1-9: 0
4. FEELING TIRED OR HAVING LITTLE ENERGY: NOT AT ALL
9. THOUGHTS THAT YOU WOULD BE BETTER OFF DEAD, OR OF HURTING YOURSELF: NOT AT ALL

## 2024-11-27 ASSESSMENT — COLUMBIA-SUICIDE SEVERITY RATING SCALE - C-SSRS
2. HAVE YOU ACTUALLY HAD ANY THOUGHTS OF KILLING YOURSELF?: NO
1. WITHIN THE PAST MONTH, HAVE YOU WISHED YOU WERE DEAD OR WISHED YOU COULD GO TO SLEEP AND NOT WAKE UP?: NO
6. HAVE YOU EVER DONE ANYTHING, STARTED TO DO ANYTHING, OR PREPARED TO DO ANYTHING TO END YOUR LIFE?: NO

## 2024-11-27 ASSESSMENT — LIFESTYLE VARIABLES
HAVE YOU OR SOMEONE ELSE BEEN INJURED AS A RESULT OF YOUR DRINKING: NO
HOW MANY STANDARD DRINKS CONTAINING ALCOHOL DO YOU HAVE ON A TYPICAL DAY: 3 OR 4
HOW OFTEN DO YOU HAVE A DRINK CONTAINING ALCOHOL: 2-4 TIMES A MONTH
HAS A RELATIVE, FRIEND, DOCTOR, OR ANOTHER HEALTH PROFESSIONAL EXPRESSED CONCERN ABOUT YOUR DRINKING OR SUGGESTED YOU CUT DOWN: NO
HOW OFTEN DURING THE LAST YEAR HAVE YOU FOUND THAT YOU WERE NOT ABLE TO STOP DRINKING ONCE YOU HAD STARTED: NEVER
HOW OFTEN DURING THE LAST YEAR HAVE YOU HAD A FEELING OF GUILT OR REMORSE AFTER DRINKING: NEVER
HOW OFTEN DURING THE LAST YEAR HAVE YOU BEEN UNABLE TO REMEMBER WHAT HAPPENED THE NIGHT BEFORE BECAUSE YOU HAD BEEN DRINKING: NEVER
HOW OFTEN DURING THE LAST YEAR HAVE YOU FAILED TO DO WHAT WAS NORMALLY EXPECTED FROM YOU BECAUSE OF DRINKING: NEVER
HOW OFTEN DURING THE LAST YEAR HAVE YOU NEEDED AN ALCOHOLIC DRINK FIRST THING IN THE MORNING TO GET YOURSELF GOING AFTER A NIGHT OF HEAVY DRINKING: NEVER

## 2024-11-27 NOTE — PROGRESS NOTES
Medicare Annual Wellness Visit    Juan Luis Billings is here for Medicare AWV    Assessment & Plan   Initial Medicare annual wellness visit  -     VISUAL SCREENING TEST, BILAT  Stage 3a chronic kidney disease (HCC)  Human immunodeficiency virus (HIV) disease (HCC)  Current moderate episode of major depressive disorder without prior episode (HCC)  Borderline type 2 diabetes mellitus  Hypertension associated with diabetes (HCC)  Hyperlipidemia associated with type 2 diabetes mellitus (HCC)  Body mass index (BMI) of 31.0 to 31.9 in adult  -     HI Behavior  obesity (8-15 min) []  ACP (advance care planning)  -     HI Advanced Care Planning (16-30 minutes) [26287]  -     Full code  Personal history of tobacco use, presenting hazards to health  -     HI Smoking and Tobacco Use Cessation (Intermediate): 3-10 MINUTES [62292]  Personal history of tobacco use  -     HI VISIT TO DISCUSS LUNG CA SCREEN W LDCT  -     CT Lung Screen (Initial/Annual/Baseline); Future  Screening for cardiovascular condition  -     HI Intensive Behavior Counseling for Cardiovascular Diseases, 8-15 minutes []  Welcome to Medicare preventive visit    Recommendations for Preventive Services Due: see orders and patient instructions/AVS.  Recommended screening schedule for the next 5-10 years is provided to the patient in written form: see Patient Instructions/AVS.     Return in 3 months (on 2/27/2025).     Subjective       Patient's complete Health Risk Assessment and screening values have been reviewed and are found in Flowsheets. The following problems were reviewed today and where indicated follow up appointments were made and/or referrals ordered.    Positive Risk Factor Screenings with Interventions:       Alcohol Screening:  AUDIT-C Score: 4  AUDIT Total Score: 4  Total Score Interpretation: 0-7 suggests low risk alcohol consumption but assess individual risks   Interventions:  See AVS for additional education material    Alcohol Misuse

## 2024-11-27 NOTE — PATIENT INSTRUCTIONS
lung cancer. If the biopsy finds cancer, you and your doctor will have to decide how or whether to treat it.  Some lung cancers found on CT scans are harmless and would not have caused a problem if they had not been found through screening. But because doctors can't tell which ones will turn out to be harmless, most will be treated. This means that you may get treatment--including surgery, radiation, or chemotherapy--that you don't need.  There is a risk of damage to cells or tissue from being exposed to radiation, including the small amounts used in CTs, X-rays, and other medical tests. Over time, exposure to radiation may cause cancer and other health problems. But in most cases, the risk of getting cancer from being exposed to small amounts of radiation is low. It's not a reason to avoid these tests for most people.  What are the benefits of screening?  Your scan may be normal (negative).  For some people who are at higher risk, screening lowers the chance of dying of lung cancer. How much and how long you smoked helps to determine your risk level. Screening can find some cancers early, when treatment may be more likely to work.  What happens after screening?  The results of your CT scan will be sent to your doctor. Someone from your care team will explain the results of your scan and answer any questions you may have. If you need any follow-up, he or she will help you understand what to do next.  After a lung cancer screening, you can go back to your usual activities right away.  A lung cancer screening test can't tell if you have lung cancer. If your results are positive, your doctor can't tell whether an abnormal finding is a harmless nodule, cancer, or something else without doing more tests.  What can you do to help prevent lung cancer?  Some lung cancers can't be prevented. But if you smoke, quitting smoking is the best step you can take to prevent lung cancer. If you want to quit, your doctor can recommend

## 2024-11-27 NOTE — ASSESSMENT & PLAN NOTE
Read the labels of the foods you buy from the market to find out how much fat is present. Under 5% of total fat on a label means it is \"low fat\". Over 20% of total fat on a label means it is \"high fat\".  Of total calories that you eat you should only be obtaining 10% or less of these calories from saturated fats.    Eat high fiber foods including green leafy vegetables, fruits, legum's and  whole grain breads. .This type of fiber helps lower cholesterol in the body. Choose oatmeal, fruits (like apples), beans, and nuts to get the most soluble fiber.    Stay away from butter, stick margarine, shortening, lard, palm and coconut oil.  Choose plant-based spreads instead.    Do not eat high-fat processed foods like hot dogs, bhatt, sausage, ham and other luncheon meats high in fat, and some frozen foods.  Routinely choose fish, chicken, turkey, and lean meats instead.    As a rule of thumb stay away from deep fried foods.

## 2024-12-02 NOTE — TELEPHONE ENCOUNTER
Obtained ART  Start date 11.29.24  Tolerating Biktarvy well.   Denies side effects or concerns  Discussed labs in 4-6 weeks. Verbalized understanding. Will call office as needed

## 2024-12-04 ENCOUNTER — TELEPHONE (OUTPATIENT)
Dept: INFECTIOUS DISEASES | Age: 74
End: 2024-12-04

## 2024-12-04 NOTE — TELEPHONE ENCOUNTER
Pt called, stated he received a medical bill from [facility],has medical insurance.  CM requested a copy of bill in order to provide assistance.  Pt will provide copy as soon as he is able.  CM will follow up with pt as needed.

## 2024-12-11 DIAGNOSIS — F32.1 MODERATE MAJOR DEPRESSION, SINGLE EPISODE (HCC): ICD-10-CM

## 2024-12-11 RX ORDER — SERTRALINE HYDROCHLORIDE 25 MG/1
25 TABLET, FILM COATED ORAL DAILY
Qty: 30 TABLET | Refills: 0 | Status: SHIPPED | OUTPATIENT
Start: 2024-12-11 | End: 2025-01-10

## 2024-12-11 NOTE — TELEPHONE ENCOUNTER
Pharmacy is requesting medication refill. Please approve or deny this request.    Rx requested:  Requested Prescriptions     Pending Prescriptions Disp Refills    sertraline (ZOLOFT) 25 MG tablet [Pharmacy Med Name: SERTRALINE 25MG TABLETS] 30 tablet 0     Sig: TAKE 1 TABLET BY MOUTH DAILY         Last Office Visit:   11/27/2024      Next Visit Date:  Future Appointments   Date Time Provider Department Center   1/20/2025 10:00 AM Kathryn León MD [facility]   3/3/2025  9:00 AM Mari Bello MD [facility] DEP

## 2024-12-19 ENCOUNTER — TELEPHONE (OUTPATIENT)
Dept: INFECTIOUS DISEASES | Age: 74
End: 2024-12-19

## 2024-12-19 NOTE — TELEPHONE ENCOUNTER
Call placed to pt as check in.   Tolerating meds well. No issues  Discussed need for labs. He will go next week.   Denies questions and will call if issues arise.

## 2024-12-27 ENCOUNTER — TELEPHONE (OUTPATIENT)
Dept: INFECTIOUS DISEASES | Age: 74
End: 2024-12-27

## 2024-12-27 DIAGNOSIS — R73.03 BORDERLINE TYPE 2 DIABETES MELLITUS: ICD-10-CM

## 2024-12-27 DIAGNOSIS — I10 HYPERTENSION, UNSPECIFIED TYPE: ICD-10-CM

## 2024-12-27 DIAGNOSIS — Z13.0 SCREENING FOR DEFICIENCY ANEMIA: ICD-10-CM

## 2024-12-27 DIAGNOSIS — B20 HUMAN IMMUNODEFICIENCY VIRUS (HIV) DISEASE (HCC): ICD-10-CM

## 2024-12-27 DIAGNOSIS — E78.2 MIXED HYPERLIPIDEMIA: ICD-10-CM

## 2024-12-27 DIAGNOSIS — N18.31 STAGE 3A CHRONIC KIDNEY DISEASE (HCC): ICD-10-CM

## 2024-12-27 PROBLEM — Z13.6 SCREENING FOR CARDIOVASCULAR CONDITION: Status: RESOLVED | Noted: 2024-11-27 | Resolved: 2024-12-27

## 2024-12-27 PROBLEM — Z00.00 INITIAL MEDICARE ANNUAL WELLNESS VISIT: Status: RESOLVED | Noted: 2024-11-27 | Resolved: 2024-12-27

## 2024-12-27 PROBLEM — Z12.5 SCREENING FOR PROSTATE CANCER: Status: RESOLVED | Noted: 2024-11-27 | Resolved: 2024-12-27

## 2024-12-27 LAB
ANION GAP SERPL CALCULATED.3IONS-SCNC: 14 MEQ/L (ref 9–15)
BUN SERPL-MCNC: 24 MG/DL (ref 8–23)
CALCIUM SERPL-MCNC: 9.3 MG/DL (ref 8.5–9.9)
CHLORIDE SERPL-SCNC: 104 MEQ/L (ref 95–107)
CO2 SERPL-SCNC: 18 MEQ/L (ref 20–31)
CREAT SERPL-MCNC: 1.43 MG/DL (ref 0.7–1.2)
GLUCOSE SERPL-MCNC: 161 MG/DL (ref 70–99)
POTASSIUM SERPL-SCNC: 4.1 MEQ/L (ref 3.4–4.9)
SODIUM SERPL-SCNC: 136 MEQ/L (ref 135–144)

## 2024-12-27 NOTE — TELEPHONE ENCOUNTER
Pt into office  Picked up lab orders.  On ART and tolerating well.   We reviewed importance of compliance and education reviewed regarding VL and CD4 as pt had forgotten.  Pt did bring in a bill for lab work. Asking for assistance. Will reviewed, discuss with CM and work on assistance as much as possible.

## 2024-12-29 LAB
HIV-1 RNA BY PCR, QN: NOT DETECTED
SOURCE: NORMAL

## 2024-12-30 ENCOUNTER — TELEPHONE (OUTPATIENT)
Dept: INFECTIOUS DISEASES | Age: 74
End: 2024-12-30

## 2024-12-30 NOTE — TELEPHONE ENCOUNTER
Per request, Cm assisted with medical bill received by pt for lab work.  CM called [facility] billing regarding bill.  CM spoke with \"Deanne\".  Per Deanne, the bill was submitted to pt's insurance, pt's responsibility is $339.32.  GOPAL noted that pt has [place] Dual coverage, therefore, CM inquired if bill was submitted to Medicaid portion of coverage after receiving payment from the Medicare portion.  Per Deanne, it was not.  [facility]AL requested resubmission for bill to be paid by Medicaid.  Deanne will resubmit for review, hold to prevent collection activity will be until 2/10/25.  GOPAL then called pt to inquire if pt has any information regarding Medicaid coverage, pt will look for letter.  CM will follow up with pt as needed.

## 2025-01-20 ENCOUNTER — TELEPHONE (OUTPATIENT)
Dept: INFECTIOUS DISEASES | Age: 75
End: 2025-01-20

## 2025-02-12 ENCOUNTER — LAB (OUTPATIENT)
Dept: INFECTIOUS DISEASES | Age: 75
End: 2025-02-12

## 2025-02-12 ENCOUNTER — OFFICE VISIT (OUTPATIENT)
Dept: INFECTIOUS DISEASES | Age: 75
End: 2025-02-12
Payer: MEDICARE

## 2025-02-12 VITALS
BODY MASS INDEX: 30.72 KG/M2 | SYSTOLIC BLOOD PRESSURE: 148 MMHG | TEMPERATURE: 97.9 F | DIASTOLIC BLOOD PRESSURE: 97 MMHG | HEART RATE: 105 BPM | WEIGHT: 208 LBS

## 2025-02-12 DIAGNOSIS — B20 HUMAN IMMUNODEFICIENCY VIRUS (HIV) DISEASE (HCC): ICD-10-CM

## 2025-02-12 DIAGNOSIS — E78.2 MIXED HYPERLIPIDEMIA: ICD-10-CM

## 2025-02-12 DIAGNOSIS — E03.8 OTHER SPECIFIED HYPOTHYROIDISM: ICD-10-CM

## 2025-02-12 DIAGNOSIS — B20 HUMAN IMMUNODEFICIENCY VIRUS (HIV) DISEASE (HCC): Primary | ICD-10-CM

## 2025-02-12 DIAGNOSIS — I10 HYPERTENSION, UNSPECIFIED TYPE: ICD-10-CM

## 2025-02-12 PROCEDURE — 3079F DIAST BP 80-89 MM HG: CPT | Performed by: INTERNAL MEDICINE

## 2025-02-12 PROCEDURE — 99214 OFFICE O/P EST MOD 30 MIN: CPT | Performed by: INTERNAL MEDICINE

## 2025-02-12 PROCEDURE — 1123F ACP DISCUSS/DSCN MKR DOCD: CPT | Performed by: INTERNAL MEDICINE

## 2025-02-12 PROCEDURE — 3077F SYST BP >= 140 MM HG: CPT | Performed by: INTERNAL MEDICINE

## 2025-02-12 PROCEDURE — 1159F MED LIST DOCD IN RCRD: CPT | Performed by: INTERNAL MEDICINE

## 2025-02-12 RX ORDER — METOPROLOL SUCCINATE 50 MG/1
50 TABLET, EXTENDED RELEASE ORAL DAILY
Qty: 90 TABLET | Refills: 1 | Status: SHIPPED | OUTPATIENT
Start: 2025-02-12

## 2025-02-12 RX ORDER — LISINOPRIL 10 MG/1
10 TABLET ORAL DAILY
Qty: 90 TABLET | Refills: 1 | Status: SHIPPED | OUTPATIENT
Start: 2025-02-12

## 2025-02-12 RX ORDER — BICTEGRAVIR SODIUM, EMTRICITABINE, AND TENOFOVIR ALAFENAMIDE FUMARATE 50; 200; 25 MG/1; MG/1; MG/1
1 TABLET ORAL DAILY
Qty: 30 TABLET | Refills: 5 | Status: ACTIVE | OUTPATIENT
Start: 2025-02-12

## 2025-02-12 RX ORDER — BICTEGRAVIR SODIUM, EMTRICITABINE, AND TENOFOVIR ALAFENAMIDE FUMARATE 50; 200; 25 MG/1; MG/1; MG/1
1 TABLET ORAL DAILY
COMMUNITY
Start: 2025-01-25 | End: 2025-02-12 | Stop reason: SDUPTHER

## 2025-02-12 RX ORDER — ROSUVASTATIN CALCIUM 20 MG/1
20 TABLET, COATED ORAL NIGHTLY
Qty: 90 TABLET | Refills: 1 | Status: SHIPPED | OUTPATIENT
Start: 2025-02-12

## 2025-02-12 RX ORDER — LEVOTHYROXINE SODIUM 75 UG/1
75 TABLET ORAL DAILY
Qty: 30 TABLET | Refills: 5 | Status: SHIPPED | OUTPATIENT
Start: 2025-02-12

## 2025-02-12 ASSESSMENT — PATIENT HEALTH QUESTIONNAIRE - PHQ9
SUM OF ALL RESPONSES TO PHQ QUESTIONS 1-9: 0
SUM OF ALL RESPONSES TO PHQ QUESTIONS 1-9: 0
1. LITTLE INTEREST OR PLEASURE IN DOING THINGS: NOT AT ALL
SUM OF ALL RESPONSES TO PHQ QUESTIONS 1-9: 0
SUM OF ALL RESPONSES TO PHQ9 QUESTIONS 1 & 2: 0
SUM OF ALL RESPONSES TO PHQ QUESTIONS 1-9: 0
2. FEELING DOWN, DEPRESSED OR HOPELESS: NOT AT ALL

## 2025-02-12 NOTE — PROGRESS NOTES
Per request, CM reviewed correspondences received: 1) Medical Bill from [employer].  Pt received final bill for lab work received from 11/24.  CM called [employer] billing.  Medicaid information is missing.  Pt is unaware of Medicaid information.  CM encouraged pt to call [facility].  CM will provide further assistance as needed.  Hold placed until 2/26/25 for collections submission); 2) Pt received letter from [employer] Medicaid Designee.  CM assisted pt is listing verifications for assistance.  CM will follow up with pt as needed.

## 2025-02-12 NOTE — PROGRESS NOTES
Routine b20 follow up post starting ART  Labs reviewed  Smoking and etoh only once a week \"Sunday, game days\"   No drug use  No misses with ART and tolerating well.  Reviewed needed vaccinations.   He did receive prevnar  Other vaccines reviewed. needs  Hep b mennin and TDAP      8.19.2010 --HEPAVAB - positive  8.19.2010 -- HEPBSAG - negative  8.28.14 -- HEPBSAB -- negative      Vaccines:  Hep B   12.17.13  6.16.14   1.15.14    Hep C AB + VL not detected      PPSV23 - 2013  TDAP - 2015

## 2025-02-12 NOTE — PROGRESS NOTES
Juan Luis Billings (:  1950) is a 74 y.o. male,New patient, here for evaluation of the following chief complaint(s):  No chief complaint on file.         Assessment & Plan    Human immunodeficiency virus (HIV) disease (HCC)  Asymptomatic HIV infection, failure of suppression secondary to noncompliance with medications and doctors follow-up appointment.  Has been off treatment for 5 years since he relocated from [place] to [place] 5 years ago.       At goal with suppressed virus  Biktarvy 1 tablet p.o. daily. Emphasize 100% compliance   CBC CMP CD4 HIV viral load in 4 months  Update vaccination including Tdap meningococcal and shingles      Mixed hyperlipidemia  Start Crestor 20 mg p.o. daily  Lipid P in 4 months  Essential hypertension, uncontrolled   Refill metoprolol 50 mg p.o. daily  Start lisinopril 10 mg p.o. daily  Continue blood pressure monitoring and follow-up with primary care physician    Borderline diabetes mellitus 2 and obesity   Emphasize routine exercise activity, low-carb and low-salt diet.  Follow-up with primary care physician    Stage IIIa chronic kidney disease could be related to both diabetes and hypertension  Repeat BMP in 4 weeks after starting HIV meds    Hypothyroidism  Restart Synthroid 75 mcg p.o. daily with follow-up free T4 TSH    Chronic cigarette smoking  Emphasized the need to get CT lung screen since it was ordered and not done  Continue smoking cessation  Subjective   HPI  Follow-up HIV, currently on Biktarvy started few months ago, well-tolerated, reports 100% compliance.  Denies any specific complaints  Has not been taking metoprolol since he ran out.   I discussed with the patient current elevated blood pressure and slight worsening in renal function  Patient has not used any drugs since he moved from [place]  Cut down on alcohol, currently drinks only 2 beers weekly  Smokes only 1 to 2 cigarettes daily  Denies any depression or anxiety  His last colonoscopy was 9 years

## 2025-02-12 NOTE — ASSESSMENT & PLAN NOTE
Asymptomatic HIV infection, failure of suppression secondary to noncompliance with medications and doctors follow-up appointment.  Has been off treatment for 5 years since he relocated from [place] to [place] 5 years ago.

## 2025-02-17 ENCOUNTER — TELEPHONE (OUTPATIENT)
Dept: INFECTIOUS DISEASES | Age: 75
End: 2025-02-17

## 2025-02-17 NOTE — TELEPHONE ENCOUNTER
Pt provided Medicaid Billing Number.  CM provided information to [facility] billing, requesting all current bills to be resubmitted to Medicaid.  CM informed pt of action taken.  CM requested pt to provided future bills to this  to have bills resubmitted to Medicaid.

## 2025-02-24 DIAGNOSIS — F32.1 MODERATE MAJOR DEPRESSION, SINGLE EPISODE (HCC): ICD-10-CM

## 2025-02-25 RX ORDER — SERTRALINE HYDROCHLORIDE 25 MG/1
25 TABLET, FILM COATED ORAL DAILY
Qty: 30 TABLET | Refills: 0 | Status: SHIPPED | OUTPATIENT
Start: 2025-02-25 | End: 2025-03-27

## 2025-02-25 NOTE — TELEPHONE ENCOUNTER
Pharmacy is requesting medication refill. Please approve or deny this request.    Rx requested:  Requested Prescriptions     Pending Prescriptions Disp Refills    sertraline (ZOLOFT) 25 MG tablet [Pharmacy Med Name: SERTRALINE 25MG TABLETS] 30 tablet 0     Sig: TAKE 1 TABLET BY MOUTH DAILY         Last Office Visit:   11/27/2024      Next Visit Date:  Future Appointments   Date Time Provider Department Center   3/3/2025  8:45 AM Mari Bello MD [facility]   6/12/2025  9:15 AM Kathryn León MD [facility]

## 2025-03-24 DIAGNOSIS — F32.1 MODERATE MAJOR DEPRESSION, SINGLE EPISODE (HCC): ICD-10-CM

## 2025-03-24 RX ORDER — SERTRALINE HYDROCHLORIDE 25 MG/1
25 TABLET, FILM COATED ORAL DAILY
Qty: 30 TABLET | Refills: 0 | Status: SHIPPED | OUTPATIENT
Start: 2025-03-24 | End: 2025-04-23

## 2025-03-24 NOTE — TELEPHONE ENCOUNTER
Comments:     Last Office Visit (last PCP visit):   11/27/2024    Next Visit Date:  Future Appointments   Date Time Provider Department Center   4/3/2025 12:30 PM Mari Bello MD [facility]   6/12/2025  9:15 AM Kathryn León MD [facility] [place]       **If hasn't been seen in over a year OR hasn't followed up according to last diabetes/ADHD visit, make appointment for patient before sending refill to provider.    Rx requested:  Requested Prescriptions     Pending Prescriptions Disp Refills    sertraline (ZOLOFT) 25 MG tablet [Pharmacy Med Name: SERTRALINE 25MG TABLETS] 30 tablet 0     Sig: TAKE 1 TABLET BY MOUTH DAILY

## 2025-03-25 ENCOUNTER — TELEPHONE (OUTPATIENT)
Dept: INFECTIOUS DISEASES | Age: 75
End: 2025-03-25

## 2025-03-25 NOTE — TELEPHONE ENCOUNTER
Pt called, received bill from [facility] for $119.83, under Final Notice.  CM called billing for assistance, spoke with Lindy.  Upon review, pt's bill will be resubmitted to [employer].  Pt has both [employer] Dual Advantage and [facility].  CM will follow up with pt as needed.

## 2025-04-23 DIAGNOSIS — F32.1 MODERATE MAJOR DEPRESSION, SINGLE EPISODE (HCC): ICD-10-CM

## 2025-04-23 RX ORDER — SERTRALINE HYDROCHLORIDE 25 MG/1
25 TABLET, FILM COATED ORAL DAILY
Qty: 30 TABLET | Refills: 0 | Status: SHIPPED | OUTPATIENT
Start: 2025-04-23 | End: 2025-05-23

## 2025-04-23 NOTE — TELEPHONE ENCOUNTER
Comments:     Last Office Visit (last PCP visit):   11/27/2024    Next Visit Date:  Future Appointments   Date Time Provider Department Center   6/12/2025  9:15 AM Kathryn León MD [facility] [place]       **If hasn't been seen in over a year OR hasn't followed up according to last diabetes/ADHD visit, make appointment for patient before sending refill to provider.    Rx requested:  Requested Prescriptions     Pending Prescriptions Disp Refills    sertraline (ZOLOFT) 25 MG tablet [Pharmacy Med Name: SERTRALINE 25MG TABLETS] 30 tablet 0     Sig: TAKE 1 TABLET BY MOUTH DAILY

## 2025-05-09 ENCOUNTER — COMMUNITY OUTREACH (OUTPATIENT)
Dept: FAMILY MEDICINE CLINIC | Age: 75
End: 2025-05-09

## 2025-05-21 ENCOUNTER — TELEPHONE (OUTPATIENT)
Age: 75
End: 2025-05-21

## 2025-05-21 DIAGNOSIS — B20 HUMAN IMMUNODEFICIENCY VIRUS (HIV) DISEASE (HCC): Primary | ICD-10-CM

## 2025-05-21 NOTE — TELEPHONE ENCOUNTER
Call placed to pt as appt reminder. He also needs lab work completed. Reviewed labs ordered. He will go to [facility] out pt lab asap          Reviewed appt date and time. Denies issues with appt day. He did remember the day. Forgot about labs.   Requested he call if issues arise regarding apt.    Doing ok otherwise. Denies issues with meds or any concerns    Denies other needs or concerns will call office if needed

## 2025-05-23 DIAGNOSIS — F32.1 MODERATE MAJOR DEPRESSION, SINGLE EPISODE (HCC): ICD-10-CM

## 2025-05-23 RX ORDER — SERTRALINE HYDROCHLORIDE 25 MG/1
25 TABLET, FILM COATED ORAL DAILY
Qty: 30 TABLET | Refills: 0 | Status: SHIPPED | OUTPATIENT
Start: 2025-05-23 | End: 2025-06-22

## 2025-05-23 NOTE — TELEPHONE ENCOUNTER
Comments:     Last Office Visit (last PCP visit):   11/27/2024    Next Visit Date:  Future Appointments   Date Time Provider Department Center   6/12/2025  9:15 AM Kathryn León MD [facility] [place]       **If hasn't been seen in over a year OR hasn't followed up according to last diabetes/ADHD visit, make appointment for patient before sending refill to provider.    Rx requested:  Requested Prescriptions     Pending Prescriptions Disp Refills    sertraline (ZOLOFT) 25 MG tablet [Pharmacy Med Name: SERTRALINE 25MG TABLETS] 30 tablet 0     Sig: TAKE 1 TABLET BY MOUTH DAILY

## 2025-05-27 ENCOUNTER — TELEPHONE (OUTPATIENT)
Age: 75
End: 2025-05-27

## 2025-05-27 DIAGNOSIS — R73.03 BORDERLINE TYPE 2 DIABETES MELLITUS: ICD-10-CM

## 2025-05-27 RX ORDER — METFORMIN HYDROCHLORIDE 500 MG/1
500 TABLET, EXTENDED RELEASE ORAL DAILY
Qty: 90 TABLET | Refills: 1 | Status: SHIPPED | OUTPATIENT
Start: 2025-05-27

## 2025-05-27 RX ORDER — BLOOD-GLUCOSE METER
EACH MISCELLANEOUS
Qty: 1 KIT | Refills: 0 | Status: SHIPPED | OUTPATIENT
Start: 2025-05-27

## 2025-05-27 NOTE — TELEPHONE ENCOUNTER
Comments:     Last Office Visit (last PCP visit):   11/27/2024    Next Visit Date:  Future Appointments   Date Time Provider Department Center   6/12/2025  9:15 AM Kathryn León MD [facility] [place]       **If hasn't been seen in over a year OR hasn't followed up according to last diabetes/ADHD visit, make appointment for patient before sending refill to provider.    Rx requested:  Requested Prescriptions     Pending Prescriptions Disp Refills    Blood Glucose Monitoring Suppl (ONE TOUCH ULTRA 2) w/Device KIT [Pharmacy Med Name: ONE TOUCH ULTRA 2 KIT]       Sig: USE DAILY    metFORMIN (GLUCOPHAGE-XR) 500 MG extended release tablet [Pharmacy Med Name: METFORMIN ER 500MG 24HR TABS] 90 tablet 1     Sig: TAKE 1 TABLET BY MOUTH DAILY WITH BREAKFAST

## 2025-05-27 NOTE — TELEPHONE ENCOUNTER
Was having issues with [facility],.   Called in with update to note he did not miss any doses of Biktarvy. He will pick it up tomorrow.   He will go for labs 6.2.25  Reviewed scheduled apt again. Denied needs.

## 2025-06-03 ENCOUNTER — TELEPHONE (OUTPATIENT)
Age: 75
End: 2025-06-03

## 2025-06-03 NOTE — TELEPHONE ENCOUNTER
Dr León not in office 6.12.25  Call placed to discuss with pt,  VM left for call back to reschedule

## 2025-06-04 ENCOUNTER — TELEPHONE (OUTPATIENT)
Age: 75
End: 2025-06-04

## 2025-06-04 DIAGNOSIS — I15.2 HYPERTENSION ASSOCIATED WITH DIABETES (HCC): ICD-10-CM

## 2025-06-04 DIAGNOSIS — N18.31 STAGE 3A CHRONIC KIDNEY DISEASE (HCC): Primary | ICD-10-CM

## 2025-06-04 DIAGNOSIS — E11.59 HYPERTENSION ASSOCIATED WITH DIABETES (HCC): ICD-10-CM

## 2025-06-04 DIAGNOSIS — B20 HUMAN IMMUNODEFICIENCY VIRUS (HIV) DISEASE (HCC): ICD-10-CM

## 2025-06-04 DIAGNOSIS — Z76.89 ESTABLISHING CARE WITH NEW DOCTOR, ENCOUNTER FOR: Primary | ICD-10-CM

## 2025-06-04 DIAGNOSIS — R73.03 BORDERLINE TYPE 2 DIABETES MELLITUS: ICD-10-CM

## 2025-06-04 LAB
ALBUMIN SERPL-MCNC: 4.6 G/DL (ref 3.5–4.6)
ALP SERPL-CCNC: 112 U/L (ref 35–104)
ALT SERPL-CCNC: 45 U/L (ref 0–41)
ANION GAP SERPL CALCULATED.3IONS-SCNC: 13 MEQ/L (ref 9–15)
AST SERPL-CCNC: 40 U/L (ref 0–40)
BILIRUB SERPL-MCNC: 0.4 MG/DL (ref 0.2–0.7)
BUN SERPL-MCNC: 20 MG/DL (ref 8–23)
CALCIUM SERPL-MCNC: 9.7 MG/DL (ref 8.5–9.9)
CHLORIDE SERPL-SCNC: 101 MEQ/L (ref 95–107)
CO2 SERPL-SCNC: 25 MEQ/L (ref 20–31)
CREAT SERPL-MCNC: 1.66 MG/DL (ref 0.7–1.2)
ERYTHROCYTE [DISTWIDTH] IN BLOOD BY AUTOMATED COUNT: 13 % (ref 11.5–14.5)
GLOBULIN SER CALC-MCNC: 4 G/DL (ref 2.3–3.5)
GLUCOSE SERPL-MCNC: 192 MG/DL (ref 70–99)
HCT VFR BLD AUTO: 42.3 % (ref 42–52)
HGB BLD-MCNC: 14.6 G/DL (ref 14–18)
MCH RBC QN AUTO: 31.3 PG (ref 27–31.3)
MCHC RBC AUTO-ENTMCNC: 34.5 % (ref 33–37)
MCV RBC AUTO: 90.8 FL (ref 79–92.2)
PLATELET # BLD AUTO: 359 K/UL (ref 130–400)
POTASSIUM SERPL-SCNC: 4.4 MEQ/L (ref 3.4–4.9)
PROT SERPL-MCNC: 8.6 G/DL (ref 6.3–8)
RBC # BLD AUTO: 4.66 M/UL (ref 4.7–6.1)
REAGIN+T PALLIDUM IGG+IGM SERPL-IMP: NORMAL
SODIUM SERPL-SCNC: 139 MEQ/L (ref 135–144)
WBC # BLD AUTO: 8 K/UL (ref 4.8–10.8)

## 2025-06-04 NOTE — TELEPHONE ENCOUNTER
Reviewed labs with pt and concerns.   Education provided and reassurance. He has been taking his medications as directed.   He has not followed with pcp due to location. He is asking for new referral closer to [facility] per doc and order placed.   Discussed nephrology referral and process. If he does not receive a call he will call this office back for assistance.   Discussed importance of follow up. He verbalized all understanding.

## 2025-06-04 NOTE — TELEPHONE ENCOUNTER
Pt rescheduled to keep apt with Dr León since she is out of office but still completed labs as directed  Reviewed recent CMP with Dr León and last office note.     Per Dr León - refer to nephrology for worsening kidney function. Pt is to also follow up with PCP regarding multiple other health conditions.     Referral to [facility] faxed with confirmation.       Call placed to pt to discuss.  VM left noting to call back to discuss lab work.

## 2025-06-06 LAB
CD3+CD4+ CELLS # BLD: 519 CELLS/UL (ref 490–1600)
CD4 % HELPER T CELL: 26 % (ref 35–68)
HIV QUANT LOG: <1.3 LOG CPY/ML
HIV-1 RNA BY PCR, QN: <20 CPY/ML
HIV-1 RNA BY PCR, QN: DETECTED
IMMUNODEFICIENCY MARKERS SPEC-IMP: ABNORMAL
SOURCE: ABNORMAL

## 2025-06-14 DIAGNOSIS — I10 HYPERTENSION, UNSPECIFIED TYPE: ICD-10-CM

## 2025-06-16 RX ORDER — METOPROLOL SUCCINATE 50 MG/1
50 TABLET, EXTENDED RELEASE ORAL DAILY
Qty: 90 TABLET | Refills: 1 | Status: SHIPPED | OUTPATIENT
Start: 2025-06-16

## 2025-06-16 NOTE — TELEPHONE ENCOUNTER
Comments:     Last Office Visit (last PCP visit):   11/27/2024    Next Visit Date:  Future Appointments   Date Time Provider Department Center   8/4/2025  9:30 AM Kathryn León MD [facility] [place]       **If hasn't been seen in over a year OR hasn't followed up according to last diabetes/ADHD visit, make appointment for patient before sending refill to provider.    Rx requested:  Requested Prescriptions     Pending Prescriptions Disp Refills    metoprolol succinate (TOPROL XL) 50 MG extended release tablet [Pharmacy Med Name: METOPROLOL ER SUCCINATE 50MG TABS] 90 tablet 1     Sig: TAKE 1 TABLET BY MOUTH DAILY

## 2025-06-22 ENCOUNTER — TELEPHONE (OUTPATIENT)
Age: 75
End: 2025-06-22

## 2025-06-22 DIAGNOSIS — F32.1 MODERATE MAJOR DEPRESSION, SINGLE EPISODE (HCC): ICD-10-CM

## 2025-06-23 RX ORDER — SERTRALINE HYDROCHLORIDE 25 MG/1
25 TABLET, FILM COATED ORAL DAILY
Qty: 30 TABLET | Refills: 0 | Status: SHIPPED | OUTPATIENT
Start: 2025-06-23 | End: 2025-07-23

## 2025-06-23 NOTE — TELEPHONE ENCOUNTER
Comments:     Last Office Visit (last PCP visit):   11/27/2024    Next Visit Date:  Future Appointments   Date Time Provider Department Center   6/25/2025  9:30 AM Gina Rodriguez LISW [facility] [place]   8/4/2025  9:30 AM Kathryn León MD [facility] [place]       **If hasn't been seen in over a year OR hasn't followed up according to last diabetes/ADHD visit, make appointment for patient before sending refill to provider.    Rx requested:  Requested Prescriptions     Pending Prescriptions Disp Refills    sertraline (ZOLOFT) 25 MG tablet [Pharmacy Med Name: SERTRALINE 25MG TABLETS] 30 tablet 0     Sig: TAKE 1 TABLET BY MOUTH DAILY

## 2025-06-25 ENCOUNTER — TELEPHONE (OUTPATIENT)
Age: 75
End: 2025-06-25

## 2025-06-25 NOTE — TELEPHONE ENCOUNTER
Pt called, was on his way to appointment for lab and with this CM.  Pt had to reschedule due to transportation issues.  Per request, CM rescheduled appointment for 6/26/25 @ 9:30AM.  Pt will have lab work first.  Per request, CM scheduled transport for appointments.  CM assessed need, arrangements as lst resort.  CM reviewed Transportation Policy with pt.     @ 8:30AM  Pt's residence  [address] [place] [address], [address]  [place] [address]    Destination  [address]  [place] [address]

## 2025-06-26 ENCOUNTER — CLINICAL SUPPORT (OUTPATIENT)
Age: 75
End: 2025-06-26

## 2025-06-26 NOTE — PROGRESS NOTES
Pt presented for scheduled appointment with CM.  CM completed Semi-Annual Review, noted change in income and insurances.  Pt obtained Medicaid coverage in addition to Medicare.  CM assisted pt in calling [facility] for a new insurance card.  Pt will receive card in 7-10 business days.  CM updated ISP, pt agreed with POC.  CM also assisted with forms received from Nephrology Office.  Pt was referred for evaluation by Dr. Kathryn León.    Per request, CM scheduled transport to appointment on 7/2/25 @ 1:45PM. CM assessed need, arrangements as last resort.  Pt is aware of Transportation Policy.       @ 1PM  Pt's residence  [address] [place] [address], [address]  [place] [address]      Destination  [address]  [place] [address]

## 2025-07-01 ENCOUNTER — TELEPHONE (OUTPATIENT)
Age: 75
End: 2025-07-01

## 2025-07-02 ENCOUNTER — TELEPHONE (OUTPATIENT)
Age: 75
End: 2025-07-02

## 2025-07-02 NOTE — TELEPHONE ENCOUNTER
Pt reached out to this CM, stated he is \"scared\".  Pt has an appointment with Nephrology today for consult.  Pt concerned he will need dialysis.  CM provided support and encouragement.  CM will follow  up with pt as needed.

## 2025-07-21 DIAGNOSIS — F32.1 MODERATE MAJOR DEPRESSION, SINGLE EPISODE (HCC): ICD-10-CM

## 2025-07-21 RX ORDER — SERTRALINE HYDROCHLORIDE 25 MG/1
25 TABLET, FILM COATED ORAL DAILY
Qty: 30 TABLET | Refills: 0 | Status: SHIPPED | OUTPATIENT
Start: 2025-07-21 | End: 2025-08-20

## 2025-07-21 NOTE — TELEPHONE ENCOUNTER
Comments:     Last Office Visit (last PCP visit):   11/27/2024    Next Visit Date:  Future Appointments   Date Time Provider Department Center   8/4/2025  9:30 AM Kathryn León MD [facility]       **If hasn't been seen in over a year OR hasn't followed up according to last diabetes/ADHD visit, make appointment for patient before sending refill to provider.    Rx requested:  Requested Prescriptions     Pending Prescriptions Disp Refills    sertraline (ZOLOFT) 25 MG tablet [Pharmacy Med Name: SERTRALINE 25MG TABLETS] 30 tablet 0     Sig: TAKE 1 TABLET BY MOUTH DAILY

## 2025-07-24 ENCOUNTER — TELEPHONE (OUTPATIENT)
Age: 75
End: 2025-07-24

## 2025-07-24 NOTE — TELEPHONE ENCOUNTER
Pt called in wanting to make sure he did not need further blood work for ID apt. Labs were already completed in June. Pt was reschedule. Pt does not need new labs. Dr León will decide during apt 8.4.25 if further labs needed    Has follow up with nephrology 8.6.25   He will request PCP lab work to be completed as well.   We discussed importance of follow up with PCP. A referral for closer PCP was placed but pt did not establish. He is asking for assistance when he comes in for his apt 8.4.25. this RN will assist him with scheduling.

## 2025-07-31 ENCOUNTER — TELEPHONE (OUTPATIENT)
Age: 75
End: 2025-07-31

## 2025-07-31 NOTE — TELEPHONE ENCOUNTER
Pt called in needing help with transport for upcoming appt.       LPN and pt reviewed additional transport options and assistance.   Pt can not afford [facility] at this time.  Pt has no other reliable transport at this time.     Discussed transport policy. [facility] is to be used as last resort and is based on available funding.    It has been determined that the patient will use [employer] provided transportation via [employer] as last report for help.       Discussed need to call 2 hours prior to apt time for cancellation. Pt verbalized  time and understanding.   [employer] notified. Pt is aware transport will be there for  about 30-45 mins prior to appointment.   Pt verbalized understanding to be ready and waiting for transport during  window.     All appts are related to medical treatment and are necessary for compliance  This transportation assistance will help patient remain in medical care by enabling them to access medical and support services.    LPN scheduled transportation per pt request,     location and  time: 9a  Residency:   [address]  [facility] [address] [phone] (home)       Appt Date, Location and apt time:   08/04/25  [address]  [place] [address]  Round trip: Yes    Denies any needs at this time. Denies any issues that need Doctor attention. Will call with concerns.

## 2025-08-01 ENCOUNTER — TELEPHONE (OUTPATIENT)
Age: 75
End: 2025-08-01

## 2025-08-01 NOTE — TELEPHONE ENCOUNTER
Call placed to pt as transport reminder for upcoming apt.     Reviewed apt date and  time. 8.4.25 at 9 am          Denies issues or concerns and will call office if needed

## 2025-08-04 ENCOUNTER — OFFICE VISIT (OUTPATIENT)
Age: 75
End: 2025-08-04
Payer: MEDICARE

## 2025-08-04 ENCOUNTER — TELEPHONE (OUTPATIENT)
Age: 75
End: 2025-08-04

## 2025-08-04 VITALS
SYSTOLIC BLOOD PRESSURE: 150 MMHG | OXYGEN SATURATION: 99 % | HEART RATE: 81 BPM | DIASTOLIC BLOOD PRESSURE: 90 MMHG | BODY MASS INDEX: 31.07 KG/M2 | WEIGHT: 210.4 LBS | TEMPERATURE: 98 F | RESPIRATION RATE: 16 BRPM

## 2025-08-04 DIAGNOSIS — E11.59 HYPERTENSION ASSOCIATED WITH DIABETES (HCC): ICD-10-CM

## 2025-08-04 DIAGNOSIS — E78.2 MIXED HYPERLIPIDEMIA: ICD-10-CM

## 2025-08-04 DIAGNOSIS — B20 HUMAN IMMUNODEFICIENCY VIRUS (HIV) DISEASE (HCC): Primary | ICD-10-CM

## 2025-08-04 DIAGNOSIS — E55.9 VITAMIN D DEFICIENCY: ICD-10-CM

## 2025-08-04 DIAGNOSIS — I15.2 HYPERTENSION ASSOCIATED WITH DIABETES (HCC): ICD-10-CM

## 2025-08-04 PROCEDURE — 1123F ACP DISCUSS/DSCN MKR DOCD: CPT | Performed by: INTERNAL MEDICINE

## 2025-08-04 PROCEDURE — 3077F SYST BP >= 140 MM HG: CPT | Performed by: INTERNAL MEDICINE

## 2025-08-04 PROCEDURE — G8428 CUR MEDS NOT DOCUMENT: HCPCS | Performed by: INTERNAL MEDICINE

## 2025-08-04 PROCEDURE — 4004F PT TOBACCO SCREEN RCVD TLK: CPT | Performed by: INTERNAL MEDICINE

## 2025-08-04 PROCEDURE — 2022F DILAT RTA XM EVC RTNOPTHY: CPT | Performed by: INTERNAL MEDICINE

## 2025-08-04 PROCEDURE — 3080F DIAST BP >= 90 MM HG: CPT | Performed by: INTERNAL MEDICINE

## 2025-08-04 PROCEDURE — 3017F COLORECTAL CA SCREEN DOC REV: CPT | Performed by: INTERNAL MEDICINE

## 2025-08-04 PROCEDURE — G8417 CALC BMI ABV UP PARAM F/U: HCPCS | Performed by: INTERNAL MEDICINE

## 2025-08-04 PROCEDURE — 3044F HG A1C LEVEL LT 7.0%: CPT | Performed by: INTERNAL MEDICINE

## 2025-08-04 PROCEDURE — 99214 OFFICE O/P EST MOD 30 MIN: CPT | Performed by: INTERNAL MEDICINE

## 2025-08-04 RX ORDER — ERGOCALCIFEROL 1.25 MG/1
50000 CAPSULE, LIQUID FILLED ORAL
Qty: 24 CAPSULE | Refills: 1 | Status: SHIPPED | OUTPATIENT
Start: 2025-08-04 | End: 2025-11-02

## 2025-08-04 RX ORDER — BLOOD PRESSURE TEST KIT
1 KIT MISCELLANEOUS DAILY
Qty: 1 KIT | Refills: 0 | Status: SHIPPED | OUTPATIENT
Start: 2025-08-04

## 2025-08-04 RX ORDER — BICTEGRAVIR SODIUM, EMTRICITABINE, AND TENOFOVIR ALAFENAMIDE FUMARATE 50; 200; 25 MG/1; MG/1; MG/1
1 TABLET ORAL DAILY
Qty: 30 TABLET | Refills: 5 | Status: ACTIVE | OUTPATIENT
Start: 2025-08-04

## 2025-08-04 RX ORDER — ROSUVASTATIN CALCIUM 20 MG/1
20 TABLET, COATED ORAL NIGHTLY
Qty: 90 TABLET | Refills: 1 | Status: SHIPPED | OUTPATIENT
Start: 2025-08-04

## 2025-08-04 ASSESSMENT — PATIENT HEALTH QUESTIONNAIRE - PHQ9
SUM OF ALL RESPONSES TO PHQ QUESTIONS 1-9: 1
2. FEELING DOWN, DEPRESSED OR HOPELESS: SEVERAL DAYS
1. LITTLE INTEREST OR PLEASURE IN DOING THINGS: NOT AT ALL
SUM OF ALL RESPONSES TO PHQ QUESTIONS 1-9: 1

## 2025-08-07 ENCOUNTER — TELEPHONE (OUTPATIENT)
Age: 75
End: 2025-08-07

## 2025-08-12 DIAGNOSIS — E03.8 OTHER SPECIFIED HYPOTHYROIDISM: ICD-10-CM

## 2025-08-12 RX ORDER — LEVOTHYROXINE SODIUM 75 UG/1
75 TABLET ORAL DAILY
Qty: 90 TABLET | Refills: 0 | Status: SHIPPED | OUTPATIENT
Start: 2025-08-12

## 2025-08-20 ENCOUNTER — TELEPHONE (OUTPATIENT)
Age: 75
End: 2025-08-20

## 2025-08-22 DIAGNOSIS — E78.2 MIXED HYPERLIPIDEMIA: ICD-10-CM

## 2025-08-22 DIAGNOSIS — I10 HYPERTENSION, UNSPECIFIED TYPE: ICD-10-CM

## 2025-08-22 RX ORDER — LISINOPRIL 10 MG/1
10 TABLET ORAL DAILY
Qty: 90 TABLET | Refills: 1 | Status: SHIPPED | OUTPATIENT
Start: 2025-08-22

## 2025-08-22 RX ORDER — ROSUVASTATIN CALCIUM 20 MG/1
20 TABLET, COATED ORAL NIGHTLY
Qty: 90 TABLET | Refills: 1 | Status: SHIPPED | OUTPATIENT
Start: 2025-08-22

## 2025-08-25 ENCOUNTER — TELEPHONE (OUTPATIENT)
Age: 75
End: 2025-08-25

## 2025-08-26 ENCOUNTER — OFFICE VISIT (OUTPATIENT)
Age: 75
End: 2025-08-26

## 2025-08-26 VITALS
HEIGHT: 69 IN | WEIGHT: 210 LBS | DIASTOLIC BLOOD PRESSURE: 84 MMHG | BODY MASS INDEX: 31.1 KG/M2 | HEART RATE: 90 BPM | RESPIRATION RATE: 16 BRPM | SYSTOLIC BLOOD PRESSURE: 138 MMHG | OXYGEN SATURATION: 96 %

## 2025-08-26 DIAGNOSIS — N18.31 STAGE 3A CHRONIC KIDNEY DISEASE (HCC): ICD-10-CM

## 2025-08-26 DIAGNOSIS — F32.1 CURRENT MODERATE EPISODE OF MAJOR DEPRESSIVE DISORDER WITHOUT PRIOR EPISODE (HCC): ICD-10-CM

## 2025-08-26 DIAGNOSIS — Z76.89 ENCOUNTER TO ESTABLISH CARE: ICD-10-CM

## 2025-08-26 DIAGNOSIS — B20 HUMAN IMMUNODEFICIENCY VIRUS (HIV) DISEASE (HCC): ICD-10-CM

## 2025-08-26 DIAGNOSIS — Z91.89 AT RISK FOR MEDICATION NONCOMPLIANCE: ICD-10-CM

## 2025-08-26 DIAGNOSIS — E78.5 HYPERLIPIDEMIA ASSOCIATED WITH TYPE 2 DIABETES MELLITUS (HCC): Primary | ICD-10-CM

## 2025-08-26 DIAGNOSIS — E11.69 HYPERLIPIDEMIA ASSOCIATED WITH TYPE 2 DIABETES MELLITUS (HCC): Primary | ICD-10-CM

## 2025-08-26 DIAGNOSIS — E03.8 OTHER SPECIFIED HYPOTHYROIDISM: ICD-10-CM

## 2025-08-26 DIAGNOSIS — R73.03 BORDERLINE TYPE 2 DIABETES MELLITUS: ICD-10-CM

## 2025-08-26 RX ORDER — METFORMIN HYDROCHLORIDE 500 MG/1
500 TABLET, EXTENDED RELEASE ORAL DAILY
Qty: 90 TABLET | Refills: 1 | Status: SHIPPED | OUTPATIENT
Start: 2025-08-26

## 2025-08-26 SDOH — ECONOMIC STABILITY: FOOD INSECURITY: WITHIN THE PAST 12 MONTHS, YOU WORRIED THAT YOUR FOOD WOULD RUN OUT BEFORE YOU GOT MONEY TO BUY MORE.: NEVER TRUE

## 2025-08-26 SDOH — ECONOMIC STABILITY: FOOD INSECURITY: WITHIN THE PAST 12 MONTHS, THE FOOD YOU BOUGHT JUST DIDN'T LAST AND YOU DIDN'T HAVE MONEY TO GET MORE.: NEVER TRUE
